# Patient Record
Sex: FEMALE | Race: WHITE | NOT HISPANIC OR LATINO | URBAN - METROPOLITAN AREA
[De-identification: names, ages, dates, MRNs, and addresses within clinical notes are randomized per-mention and may not be internally consistent; named-entity substitution may affect disease eponyms.]

---

## 2025-01-19 ENCOUNTER — OFFICE VISIT (OUTPATIENT)
Dept: URGENT CARE | Age: 35
End: 2025-01-19
Payer: COMMERCIAL

## 2025-01-19 VITALS
DIASTOLIC BLOOD PRESSURE: 81 MMHG | TEMPERATURE: 98.1 F | RESPIRATION RATE: 16 BRPM | HEART RATE: 95 BPM | OXYGEN SATURATION: 97 % | SYSTOLIC BLOOD PRESSURE: 121 MMHG

## 2025-01-19 DIAGNOSIS — N39.0 URINARY TRACT INFECTION WITHOUT HEMATURIA, SITE UNSPECIFIED: ICD-10-CM

## 2025-01-19 LAB
POC APPEARANCE, URINE: CLEAR
POC BILIRUBIN, URINE: NEGATIVE
POC BLOOD, URINE: ABNORMAL
POC COLOR, URINE: YELLOW
POC GLUCOSE, URINE: NEGATIVE MG/DL
POC KETONES, URINE: ABNORMAL MG/DL
POC LEUKOCYTES, URINE: ABNORMAL
POC NITRITE,URINE: NEGATIVE
POC PH, URINE: 6 PH
POC PROTEIN, URINE: NEGATIVE MG/DL
POC SPECIFIC GRAVITY, URINE: 1.01
POC UROBILINOGEN, URINE: 0.2 EU/DL

## 2025-01-19 PROCEDURE — 87086 URINE CULTURE/COLONY COUNT: CPT

## 2025-01-19 PROCEDURE — 87186 SC STD MICRODIL/AGAR DIL: CPT

## 2025-01-19 PROCEDURE — 99203 OFFICE O/P NEW LOW 30 MIN: CPT

## 2025-01-19 PROCEDURE — 81003 URINALYSIS AUTO W/O SCOPE: CPT

## 2025-01-19 RX ORDER — NITROFURANTOIN 25; 75 MG/1; MG/1
100 CAPSULE ORAL 2 TIMES DAILY
Qty: 14 CAPSULE | Refills: 0 | Status: SHIPPED | OUTPATIENT
Start: 2025-01-19 | End: 2025-01-26

## 2025-01-19 RX ORDER — PHENAZOPYRIDINE HYDROCHLORIDE 200 MG/1
200 TABLET, FILM COATED ORAL 3 TIMES DAILY PRN
Qty: 10 TABLET | Refills: 0 | Status: SHIPPED | OUTPATIENT
Start: 2025-01-19 | End: 2025-01-22

## 2025-01-19 NOTE — PROGRESS NOTES
Subjective   Patient ID: Indu Feliciano is a 34 y.o. female. They present today with a chief complaint of concern UTI.    History of Present Illness  Yunior Feliciano is a 34 y.o. female. They present today with a chief complaint of concern UTI. Hx of UTI    Past Medical History  Allergies as of 2025    (Not on File)       (Not in a hospital admission)       Past Medical History:   Diagnosis Date    Personal history of other diseases of the digestive system 2019    History of gastroesophageal reflux (GERD)       Past Surgical History:   Procedure Laterality Date    OTHER SURGICAL HISTORY  2019    Oophorectomy    OTHER SURGICAL HISTORY  2019    Cholecystectomy    OTHER SURGICAL HISTORY  2019     section            Review of Systems  Review of Systems  Urine freq and urgency    Objective    Vitals:    25 1837   BP: 121/81   Pulse: 95   Resp: 16   Temp: 36.7 °C (98.1 °F)   SpO2: 97%     No LMP recorded.    Physical Exam  No CVA tenderness  Procedures    Point of Care Test & Imaging Results from this visit  No results found for this visit on 25.   No results found.    Diagnostic study results (if any) were reviewed by WAYNE Horn.    Assessment/Plan   Allergies, medications, history, and pertinent labs/EKGs/Imaging reviewed by WAYNE Horn.     Medical Decision Making  S/s of UTI with hematuria ie leuk and blood    UC sent    Will call with abnormal results    Macrobid 100mg BID for 7 days sent with pyridium.    Follow up with pcp    As a result of the work-up, the patient was discharged home.  she was informed of her diagnosis and instructed to come back with any concerns or worsening of condition.  she and was agreeable to the plan as discussed above.  she was given the opportunity to ask questions.  All of the patient's questions were answered.    This document was generated using the assistance of voice recognition software. If  there are any errors of spelling, grammar, syntax, or meaning; please feel free to contact me directly for clarification.     Orders and Diagnoses  Diagnoses and all orders for this visit:  Urinary tract infection without hematuria, site unspecified  -     POCT UA Automated manually resulted      Medical Admin Record      Patient disposition: Home    Electronically signed by WAYNE Horn  6:45 PM

## 2025-01-22 LAB — BACTERIA UR CULT: ABNORMAL
